# Patient Record
Sex: FEMALE | Race: BLACK OR AFRICAN AMERICAN | NOT HISPANIC OR LATINO | URBAN - METROPOLITAN AREA
[De-identification: names, ages, dates, MRNs, and addresses within clinical notes are randomized per-mention and may not be internally consistent; named-entity substitution may affect disease eponyms.]

---

## 2022-02-15 ENCOUNTER — OFFICE VISIT (OUTPATIENT)
Dept: URBAN - METROPOLITAN AREA CLINIC 70 | Facility: CLINIC | Age: 72
End: 2022-02-15

## 2022-04-12 ENCOUNTER — LAB OUTSIDE AN ENCOUNTER (OUTPATIENT)
Dept: URBAN - METROPOLITAN AREA CLINIC 70 | Facility: CLINIC | Age: 72
End: 2022-04-12

## 2022-04-12 ENCOUNTER — WEB ENCOUNTER (OUTPATIENT)
Dept: URBAN - METROPOLITAN AREA CLINIC 70 | Facility: CLINIC | Age: 72
End: 2022-04-12

## 2022-04-12 ENCOUNTER — OFFICE VISIT (OUTPATIENT)
Dept: URBAN - METROPOLITAN AREA CLINIC 70 | Facility: CLINIC | Age: 72
End: 2022-04-12
Payer: COMMERCIAL

## 2022-04-12 VITALS
HEIGHT: 62 IN | BODY MASS INDEX: 38.64 KG/M2 | DIASTOLIC BLOOD PRESSURE: 81 MMHG | WEIGHT: 210 LBS | SYSTOLIC BLOOD PRESSURE: 141 MMHG | HEART RATE: 96 BPM | TEMPERATURE: 97.3 F

## 2022-04-12 DIAGNOSIS — R10.84 GENERALIZED ABDOMINAL PAIN: ICD-10-CM

## 2022-04-12 DIAGNOSIS — K59.04 CHRONIC IDIOPATHIC CONSTIPATION: ICD-10-CM

## 2022-04-12 PROBLEM — 82934008 CHRONIC IDIOPATHIC CONSTIPATION: Status: ACTIVE | Noted: 2022-04-12

## 2022-04-12 PROCEDURE — 99214 OFFICE O/P EST MOD 30 MIN: CPT | Performed by: REGISTERED NURSE

## 2022-04-12 RX ORDER — PRIMIDONE 50 MG/1
1 TABLET TABLET ORAL ONCE A DAY
Status: ACTIVE | COMMUNITY

## 2022-04-12 RX ORDER — DILTIAZEM HYDROCHLORIDE 120 MG/1
TABLET ORAL
Qty: 0 | Refills: 0 | Status: ACTIVE | COMMUNITY
Start: 2019-12-07

## 2022-04-12 RX ORDER — ATORVASTATIN CALCIUM 20 MG/1
TABLET, FILM COATED ORAL
Qty: 0 | Refills: 0 | Status: ACTIVE | COMMUNITY
Start: 2019-10-09

## 2022-04-12 RX ORDER — PANTOPRAZOLE SODIUM 40 MG/1
TABLET, DELAYED RELEASE ORAL
Qty: 0 | Refills: 0 | Status: ACTIVE | COMMUNITY
Start: 2019-08-02

## 2022-04-12 RX ORDER — SERTRALINE 100 MG/1
TABLET, FILM COATED ORAL
Qty: 0 | Refills: 0 | Status: ACTIVE | COMMUNITY
Start: 2019-11-25

## 2022-04-12 RX ORDER — APIXABAN 5 MG/1
TABLET, FILM COATED ORAL
Qty: 0 | Refills: 0 | Status: ACTIVE | COMMUNITY
Start: 2019-09-25

## 2022-04-12 RX ORDER — SERTRALINE 50 MG/1
TABLET, FILM COATED ORAL
Qty: 0 | Refills: 0 | Status: ACTIVE | COMMUNITY
Start: 2019-05-07

## 2022-04-12 RX ORDER — TRAZODONE HYDROCHLORIDE 50 MG/1
TABLET ORAL
Qty: 0 | Refills: 0 | Status: ACTIVE | COMMUNITY
Start: 2019-11-25

## 2022-04-12 RX ORDER — KRILL/OM-3/DHA/EPA/PHOSPHO/AST 1000-230MG
1 TABLET CAPSULE ORAL ONCE A DAY
Status: ACTIVE | COMMUNITY

## 2022-04-12 RX ORDER — CHLORTHALIDONE 50 MG/1
1 TABLET IN THE MORNING WITH FOOD TABLET ORAL ONCE A DAY
Status: ACTIVE | COMMUNITY

## 2022-04-12 RX ORDER — DIGOXIN 125 UG/1
1 TABLET TABLET ORAL
Status: ACTIVE | COMMUNITY

## 2022-04-12 NOTE — HPI-TODAY'S VISIT:
Pt presents with abdominal pain. Pain has been present for 2 months. Pain is located in the entire abdomen. Pain occurs for hours at a time. Pain is described as sharp and crampy. Pain is aggravated by nothing in particular. Pain is alleviated by nothing in particular. Pain occurs intermittently. Associated symptoms are bloating and constipation. Bowels move a couple of times a week. She frequently uses over the counter laxatives and enemas. EGD 2/4/20 was normal. Colonoscopy 2/4/20 was normal.

## 2022-04-18 ENCOUNTER — TELEPHONE ENCOUNTER (OUTPATIENT)
Dept: URBAN - METROPOLITAN AREA CLINIC 70 | Facility: CLINIC | Age: 72
End: 2022-04-18

## 2022-05-17 ENCOUNTER — OFFICE VISIT (OUTPATIENT)
Dept: URBAN - METROPOLITAN AREA CLINIC 70 | Facility: CLINIC | Age: 72
End: 2022-05-17

## 2023-09-27 ENCOUNTER — DASHBOARD ENCOUNTERS (OUTPATIENT)
Age: 73
End: 2023-09-27

## 2023-09-29 ENCOUNTER — OFFICE VISIT (OUTPATIENT)
Dept: URBAN - METROPOLITAN AREA CLINIC 70 | Facility: CLINIC | Age: 73
End: 2023-09-29

## 2023-09-29 RX ORDER — DIGOXIN 125 UG/1
1 TABLET TABLET ORAL
Status: DISCONTINUED | COMMUNITY

## 2023-09-29 RX ORDER — PANTOPRAZOLE SODIUM 40 MG/1
TABLET, DELAYED RELEASE ORAL
Qty: 0 | Refills: 0 | Status: DISCONTINUED | COMMUNITY
Start: 2019-08-02

## 2023-09-29 RX ORDER — DILTIAZEM HYDROCHLORIDE 120 MG/1
TABLET ORAL
Qty: 0 | Refills: 0 | Status: DISCONTINUED | COMMUNITY
Start: 2019-12-07

## 2023-09-29 RX ORDER — SERTRALINE 100 MG/1
TABLET, FILM COATED ORAL
Qty: 0 | Refills: 0 | Status: DISCONTINUED | COMMUNITY
Start: 2019-11-25

## 2023-09-29 RX ORDER — TRAZODONE HYDROCHLORIDE 50 MG/1
TABLET ORAL
Qty: 0 | Refills: 0 | Status: DISCONTINUED | COMMUNITY
Start: 2019-11-25

## 2023-09-29 RX ORDER — KRILL/OM-3/DHA/EPA/PHOSPHO/AST 1000-230MG
1 TABLET CAPSULE ORAL ONCE A DAY
Status: DISCONTINUED | COMMUNITY

## 2023-09-29 RX ORDER — APIXABAN 5 MG/1
TABLET, FILM COATED ORAL
Qty: 0 | Refills: 0 | Status: DISCONTINUED | COMMUNITY
Start: 2019-09-25

## 2023-09-29 RX ORDER — CHLORTHALIDONE 50 MG/1
1 TABLET IN THE MORNING WITH FOOD TABLET ORAL ONCE A DAY
Status: DISCONTINUED | COMMUNITY

## 2023-09-29 RX ORDER — ATORVASTATIN CALCIUM 20 MG/1
TABLET, FILM COATED ORAL
Qty: 0 | Refills: 0 | Status: DISCONTINUED | COMMUNITY
Start: 2019-10-09

## 2023-09-29 RX ORDER — SERTRALINE 50 MG/1
TABLET, FILM COATED ORAL
Qty: 0 | Refills: 0 | Status: DISCONTINUED | COMMUNITY
Start: 2019-05-07

## 2023-09-29 RX ORDER — PRIMIDONE 50 MG/1
1 TABLET TABLET ORAL ONCE A DAY
Status: DISCONTINUED | COMMUNITY